# Patient Record
Sex: MALE | Race: WHITE | Employment: OTHER | URBAN - METROPOLITAN AREA
[De-identification: names, ages, dates, MRNs, and addresses within clinical notes are randomized per-mention and may not be internally consistent; named-entity substitution may affect disease eponyms.]

---

## 2018-01-22 ENCOUNTER — HOSPITAL ENCOUNTER (OUTPATIENT)
Dept: LAB | Age: 68
Discharge: HOME | End: 2018-01-22

## 2018-01-22 ENCOUNTER — HOSPITAL ENCOUNTER (OUTPATIENT)
Dept: LAB | Age: 68
Discharge: HOME | End: 2018-01-22
Payer: MEDICARE

## 2018-01-22 ENCOUNTER — OFFICE VISIT (OUTPATIENT)
Dept: HEMATOLOGY | Age: 68
End: 2018-01-22

## 2018-01-22 VITALS
HEART RATE: 68 BPM | HEIGHT: 74 IN | WEIGHT: 236 LBS | BODY MASS INDEX: 30.29 KG/M2 | SYSTOLIC BLOOD PRESSURE: 142 MMHG | TEMPERATURE: 98.7 F | DIASTOLIC BLOOD PRESSURE: 86 MMHG | OXYGEN SATURATION: 98 % | RESPIRATION RATE: 16 BRPM

## 2018-01-22 DIAGNOSIS — K74.60 CIRRHOSIS OF LIVER WITHOUT ASCITES, UNSPECIFIED HEPATIC CIRRHOSIS TYPE (HCC): ICD-10-CM

## 2018-01-22 DIAGNOSIS — K74.60 CIRRHOSIS OF LIVER WITHOUT ASCITES, UNSPECIFIED HEPATIC CIRRHOSIS TYPE (HCC): Primary | ICD-10-CM

## 2018-01-22 PROBLEM — I10 HYPERTENSION: Status: ACTIVE | Noted: 2018-01-22

## 2018-01-22 PROBLEM — K21.9 GERD (GASTROESOPHAGEAL REFLUX DISEASE): Status: ACTIVE | Noted: 2018-01-22

## 2018-01-22 PROBLEM — C85.90 NON HODGKIN'S LYMPHOMA (HCC): Status: ACTIVE | Noted: 2018-01-22

## 2018-01-22 PROBLEM — D69.6 THROMBOCYTOPENIA (HCC): Status: ACTIVE | Noted: 2018-01-22

## 2018-01-22 PROCEDURE — 82103 ALPHA-1-ANTITRYPSIN TOTAL: CPT | Performed by: INTERNAL MEDICINE

## 2018-01-22 PROCEDURE — 86038 ANTINUCLEAR ANTIBODIES: CPT | Performed by: INTERNAL MEDICINE

## 2018-01-22 PROCEDURE — 80076 HEPATIC FUNCTION PANEL: CPT | Performed by: INTERNAL MEDICINE

## 2018-01-22 PROCEDURE — 86708 HEPATITIS A ANTIBODY: CPT | Performed by: INTERNAL MEDICINE

## 2018-01-22 PROCEDURE — 82728 ASSAY OF FERRITIN: CPT | Performed by: INTERNAL MEDICINE

## 2018-01-22 PROCEDURE — 80048 BASIC METABOLIC PNL TOTAL CA: CPT | Performed by: INTERNAL MEDICINE

## 2018-01-22 PROCEDURE — 36415 COLL VENOUS BLD VENIPUNCTURE: CPT | Performed by: INTERNAL MEDICINE

## 2018-01-22 PROCEDURE — 83540 ASSAY OF IRON: CPT | Performed by: INTERNAL MEDICINE

## 2018-01-22 PROCEDURE — 81256 HFE GENE: CPT | Performed by: INTERNAL MEDICINE

## 2018-01-22 PROCEDURE — 85025 COMPLETE CBC W/AUTO DIFF WBC: CPT | Performed by: INTERNAL MEDICINE

## 2018-01-22 PROCEDURE — 85610 PROTHROMBIN TIME: CPT | Performed by: INTERNAL MEDICINE

## 2018-01-22 PROCEDURE — 83516 IMMUNOASSAY NONANTIBODY: CPT | Performed by: INTERNAL MEDICINE

## 2018-01-22 RX ORDER — LANOLIN ALCOHOL/MO/W.PET/CERES
1000 CREAM (GRAM) TOPICAL DAILY
COMMUNITY

## 2018-01-22 RX ORDER — CARVEDILOL 25 MG/1
25 TABLET ORAL 2 TIMES DAILY WITH MEALS
COMMUNITY

## 2018-01-22 RX ORDER — AMLODIPINE AND BENAZEPRIL HYDROCHLORIDE 5; 20 MG/1; MG/1
1 CAPSULE ORAL DAILY
COMMUNITY

## 2018-01-22 NOTE — PROGRESS NOTES
70 Lidia Mcnulty MD, Lane Butt, Cite Mello Tay, Janice Gross, CHIQUITA Durham, SALIMA Geller, [facility]   Claritza Lyons, CHIQUITA Browning, CHIQUITA Lopez [facility] 136    at [address], [address] [address]  22.    [phone]    FAX: [address] [place] [address]    at [facility], [address] [place] [address], [address]    [phone]    FAX: [phone]         Patient Care Team:  Aurelio Anne MD as PCP - General (Internal Medicine)      Problem List  Date Reviewed: 1/22/2018          Codes Class Noted    Non Hodgkin's lymphoma [facility]) ICD-10-CM: C85.90  ICD-9-CM: 202.80  1/22/2018        Thrombocytopenia ([address] ICD-10-CM: D69.6  ICD-9-CM: 287.5  1/22/2018        Hypertension ICD-10-CM: I10  ICD-9-CM: 401.9  1/22/2018        GERD (gastroesophageal reflux disease) ICD-10-CM: K21.9  ICD-9-CM: 530.81  1/22/2018                The physicians listed above have asked me to see Karin Mendoza in consultation regarding elevated liver enzymes and its management. All medical records sent by the referring physicians were reviewed including imaging studies     The patient is a 74 y.o.  male who was first noted to have abnormalities in liver transaminases in 2016. Serologic evaluation for markers of chronic liver disease were negative for HCV, HBV,iron studies,    Ultrasound of the liver was performed in 2016. The results of the imaging suggested fatty liver disease. Assessment of liver fibrosis was performed with sheer wave elastogrphy at [facility] in 2/2018. The result was 5.6 kPa which correlates with none-mild fibrosis. The patient had taken chemotherapy for lymphoma between 1/2014-3/2014.     The most recent laboratory studies indicate that the liver transaminases are elevated, ALP is normal, tests of hepatic synthetic and metabolic function are normal, and the platelet count is   depressed. The patient has no symptoms which could be attributed to the liver disorder. The patient completes all daily activities without any functional limitations. The patient has not experienced fatigue, pain in the right side over the liver,       ALLERGIES  No Known Allergies    MEDICATIONS  Current Outpatient Prescriptions   Medication Sig    amLODIPine-benazepril (LOTREL) 5-20 mg per capsule Take 1 Cap by mouth daily.  carvedilol (COREG) 25 mg tablet Take 25 mg by mouth two (2) times daily (with meals).  OMEPRAZOLE PO Take  by mouth.  cyanocobalamin 1,000 mcg tablet Take 1,000 mcg by mouth daily.  FERROUS SULFATE (IRON PO) Take  by mouth.  DOCOSAHEXANOIC ACID/EPA (FISH OIL PO) Take  by mouth. No current facility-administered medications for this visit. SYSTEM REVIEW NOT RELATED TO LIVER DISEASE OR REVIEWED ABOVE:  Constitution systems: Negative for fever, chills, weight gain, weight loss. Eyes: Negative for visual changes. ENT: Negative for sore throat, painful swallowing. Respiratory: Negative for cough, hemoptysis, SOB. Cardiology: Negative for chest pain, palpitations. GI:  Negative for constipation or diarrhea. : Negative for urinary frequency, dysuria, hematuria, nocturia. Skin: Negative for rash. Hematology: Negative for easy bruising, blood clots. Musculo-skelatal: Negative for back pain, muscle pain, weakness. Neurologic: Negative for headaches, dizziness, vertigo, memory problems not related to HE. Psychology: Negative for anxiety, depression. FAMILY HISTORY:  The father  of MI. The mother  of CHF. There is no family history of liver disease. SOCIAL HISTORY:  The patient is . The patient has 3 children, and 4 grandchildren.     The patient has never used tobacco products. The patient has never consumed significant amounts of alcohol. The patient currently works full time in real estate. PHYSICAL EXAMINATION:  Visit Vitals    /86 (BP 1 Location: Left arm, BP Patient Position: Sitting)    Pulse 68    Temp 98.7 °F (37.1 °C) (Tympanic)    Resp 16    Ht 6' 2\" (1.88 m)    Wt 236 lb (107 kg)    SpO2 98%    BMI 30.3 kg/m2     General: No acute distress. Eyes: Sclera anicteric. ENT: No oral lesions. Thyroid normal.  Nodes: No adenopathy. Skin: No spider angiomata. No jaundice. No palmar erythema. Respiratory: Lungs clear to auscultation. Cardiovascular: Regular heart rate. No murmurs. No JVD. Abdomen: Soft non-tender. Liver size normal to percussion/palpation. Spleen not palpable. No obvious ascites. Extremities: No edema. No muscle wasting. No gross arthritic changes. Neurologic: Alert and oriented. Cranial nerves grossly intact. No asterixis. LABORATORY STUDIES:  [facility] [place] [address] Units 1/22/2018   WBC 4.6 - 13.2 K/uL 6.0   ANC 1.8 - 8.0 K/UL 3.7   HGB 13.0 - 16.0 g/dL 14.3    - 420 K/uL 111 (L)   INR 0.8 - 1.2   1.0   AST 15 - 37 U/L 28   ALT 16 - 61 U/L 45   Alk Phos 45 - 117 U/L 83   Bili, Total 0.2 - 1.0 MG/DL 0.5   Bili, Direct 0.0 - 0.2 MG/DL 0.1   Albumin 3.4 - 5.0 g/dL 4.0   BUN 7.0 - 18 MG/DL 23 (H)   Creat 0.6 - 1.3 MG/DL 1.24   Na 136 - 145 mmol/L 140   K 3.5 - 5.5 mmol/L 4.0   Cl 100 - 108 mmol/L 103   CO2 21 - 32 mmol/L 29   Glucose 74 - 99 mg/dL 99     SEROLOGIES:  11/2016  HAV total negative, HBsAntigen negative, anti-HBcore negative, anti-HBsurface negative, anti-HCV negative,     Serologies Latest Ref Rng & Units 1/22/2018   Hep A Ab, Total NEGATIVE   NEGATIVE   Ferritin 8 - 388 NG/ML 93   Iron % Saturation % 34   DIAZ, IFA  NEGATIVE   ASMCA 0 - 19 Units 6   Alpha-1 antitrypsin level 90 - 200 mg/dL 73 (L)     LIVER HISTOLOGY:  2/2018. Sheer wave elastography performed at [facility].   E Range: 4.9-6.7 kPa, E Mean: 5.9 kPa, E Median: 5.7 kPa, E Std: 0.67 kPa. The results suggested a fibrosis level of F0-1. ENDOSCOPIC PROCEDURES:  Not available or performed    RADIOLOGY:  2/2018. Ultrasound of liver. Echogenic consistent with chronic liver disease. No liver mass lesions. No dilated bile ducts. No ascites. OTHER TESTING:  Not available or performed    ASSESSMENT AND PLAN:  Persistent elevation in liver transaminases of unclear etiology at this time. Liver function is normal.  The platelet count is normal.    Serologic testing to help define the cause of the laboratory abnormality were ordered. Will perform imaging of the liver with ultrasound. The need to assess liver fibrosis was discussed. Sheer wave elastography can assess liver fibrosis and determine if a patient has advanced fibrosis or cirrhosis without the need for liver biopsy. Sheer wave elastography is now available at [facility]. This will be scheduled. If elastrography suggests advanced fibrosis then a liver biopsy should be considered. The patient was counseled regarding alcohol consumption. Vaccination for viral hepatitis A and B is recommended since the patient has no serologic evidence of previous exposure or vaccination with immunity. All of the above issues were discussed with the patient. All questions were answered. The patient expressed a clear understanding of the above. [address] [place] [address] in 4 weeks to review all data and determine the treatment plan.     MD Joe Mckeon 13 of [facility] of [address], [address] [place] [address] - [address]  [phone]

## 2018-01-22 NOTE — PROGRESS NOTES
1. Have you been to the ER, urgent care clinic since your last visit? Hospitalized since your last visit? No    2. Have you seen or consulted any other health care providers outside of the [address] [place] [address] since your last visit? Include any pap smears or colon screening.  No

## 2018-01-22 NOTE — MR AVS SNAPSHOT
[address] [place] [address] 
[phone] Patient: Jeff Daniels MRN: [id number] [facility]:9/60[birth year]708 Visit Information Date & Time Provider Department Dept. Phone Encounter #  
 1/22/2018  3:30 PM MD Joe Alves 13 of  [address] [id number] Follow-up Instructions Return in about 4 weeks (around 2/19/2018) for MLS. Upcoming Health Maintenance Date Due Hepatitis C Screening 1950 DTaP/Tdap/Td series (1 - Tdap) 7/21/1973 FOBT Q 1 YEAR AGE 50-75 7/21/2002 ZOSTER VACCINE AGE 60> 5/21/2012 GLAUCOMA SCREENING Q2Y 7/21/2017 Pneumococcal 65+ Low/Medium Risk (1 of 2 - PCV13) 7/21/2017 MEDICARE YEARLY EXAM 7/21/2017 Influenza Age 5 to Adult 8/1/2017 Allergies as of 1/22/2018  Review Complete On: 1/22/2018 By: Marylee Bogus No Known Allergies Current Immunizations  Never Reviewed No immunizations on file. Not reviewed this visit You Were Diagnosed With   
  
 Codes Comments Cirrhosis of liver without ascites, unspecified hepatic cirrhosis type ([facility]    -  Primary ICD-10-CM: K74.60 ICD-9-CM: 571.5 Vitals BP Pulse Temp Resp Height(growth percentile) 142/86 (BP 1 Location: Left arm, BP Patient Position: Sitting) 68 98.7 °F (37.1 °C) (Tympanic) 16 6' 2\" (1.88 m) Weight(growth percentile) SpO2 BMI Smoking Status 236 lb (107 kg) 98% 30.3 kg/m2 Never Smoker Vitals History BMI and BSA Data Body Mass Index Body Surface Area  
 30.3 kg/m 2 2.36 m 2 Your Updated Medication List  
  
   
This list is accurate as of: 1/22/18  4:07 PM.  Always use your most recent med list. amLODIPine-benazepril 5-20 mg per capsule Commonly known as:  Raul Dubin Take 1 Cap by mouth daily. COREG 25 mg tablet Generic drug:  carvedilol Take 25 mg by mouth two (2) times daily (with meals). cyanocobalamin 1,000 mcg tablet Take 1,000 mcg by mouth daily. FISH OIL PO Take  by mouth. IRON PO Take  by mouth. OMEPRAZOLE PO Take  by mouth. Follow-up Instructions Return in about 4 weeks (around 2/19/2018) for MLS. To-Do List   
 01/22/2018 Imaging:  US ABD LTD W ELASTOGRAPHY Introducing [facility] & HEALTH SERVICES! Erin Maynard introduces Start[employer] patient portal. Now you can access parts of your medical record, email your doctor's office, and request medication refills online. 1. In your internet browser, go to [email or web address] 2. Click on the First Time User? Click Here link in the Sign In box. You will see the New Member Sign Up page. 3. Enter your StartX Access Code exactly as it appears below. You will not need to use this code after youve completed the sign-up process. If you do not sign up before the expiration date, you must request a new code. · StartX Access Code: [email or web address] Expires: 4/22/2018  4:07 PM 
 
4. Enter the last four digits of your Social Security Number (xxxx) and Date of Birth (mm/dd/yyyy) as indicated and click Submit. You will be taken to the next sign-up page. 5. Create a StartX ID. This will be your StartX login ID and cannot be changed, so think of one that is secure and easy to remember. 6. Create a StartX password. You can change your password at any time. 7. Enter your Password Reset Question and Answer. This can be used at a later time if you forget your password. 8. Enter your e-mail address. You will receive e-mail notification when new information is available in [address] 9. Click Sign Up. You can now view and download portions of your medical record. 10. Click the Download Summary menu link to download a portable copy of your medical information.  
 
If you have questions, please visit the Frequently Asked Questions section of the [employer]. Remember, [employer] is NOT to be used for urgent needs. For medical emergencies, dial 911. Now available from your iPhone and Android! Please provide this summary of care documentation to your next provider. Your primary care clinician is listed as Leo Ram. If you have any questions after today's visit, please call [phone].

## 2018-01-23 LAB
ALBUMIN SERPL-MCNC: 4 G/DL (ref 3.4–5)
ALBUMIN/GLOB SERPL: 1.3 {RATIO} (ref 0.8–1.7)
ALP SERPL-CCNC: 83 U/L (ref 45–117)
ALT SERPL-CCNC: 45 U/L (ref 16–61)
ANION GAP SERPL CALC-SCNC: 8 MMOL/L (ref 3–18)
AST SERPL-CCNC: 28 U/L (ref 15–37)
BASOPHILS # BLD: 0 K/UL (ref 0–0.06)
BASOPHILS NFR BLD: 1 % (ref 0–2)
BILIRUB DIRECT SERPL-MCNC: 0.1 MG/DL (ref 0–0.2)
BILIRUB SERPL-MCNC: 0.5 MG/DL (ref 0.2–1)
BUN SERPL-MCNC: 23 MG/DL (ref 7–18)
BUN/CREAT SERPL: 19 (ref 12–20)
CALCIUM SERPL-MCNC: 9.1 MG/DL (ref 8.5–10.1)
CHLORIDE SERPL-SCNC: 103 MMOL/L (ref 100–108)
CO2 SERPL-SCNC: 29 MMOL/L (ref 21–32)
CREAT SERPL-MCNC: 1.24 MG/DL (ref 0.6–1.3)
DIFFERENTIAL METHOD BLD: ABNORMAL
EOSINOPHIL # BLD: 0.3 K/UL (ref 0–0.4)
EOSINOPHIL NFR BLD: 5 % (ref 0–5)
ERYTHROCYTE [DISTWIDTH] IN BLOOD BY AUTOMATED COUNT: 13.5 % (ref 11.6–14.5)
FERRITIN SERPL-MCNC: 93 NG/ML (ref 8–388)
GLOBULIN SER CALC-MCNC: 3 G/DL (ref 2–4)
GLUCOSE SERPL-MCNC: 99 MG/DL (ref 74–99)
HCT VFR BLD AUTO: 42.3 % (ref 36–48)
HGB BLD-MCNC: 14.3 G/DL (ref 13–16)
INR PPP: 1 (ref 0.8–1.2)
IRON SATN MFR SERPL: 34 %
IRON SERPL-MCNC: 110 UG/DL (ref 50–175)
LYMPHOCYTES # BLD: 1.4 K/UL (ref 0.9–3.6)
LYMPHOCYTES NFR BLD: 23 % (ref 21–52)
MCH RBC QN AUTO: 31.6 PG (ref 24–34)
MCHC RBC AUTO-ENTMCNC: 33.8 G/DL (ref 31–37)
MCV RBC AUTO: 93.6 FL (ref 74–97)
MONOCYTES # BLD: 0.6 K/UL (ref 0.05–1.2)
MONOCYTES NFR BLD: 10 % (ref 3–10)
NEUTS SEG # BLD: 3.7 K/UL (ref 1.8–8)
NEUTS SEG NFR BLD: 61 % (ref 40–73)
PLATELET # BLD AUTO: 111 K/UL (ref 135–420)
PMV BLD AUTO: 10.2 FL (ref 9.2–11.8)
POTASSIUM SERPL-SCNC: 4 MMOL/L (ref 3.5–5.5)
PROT SERPL-MCNC: 7 G/DL (ref 6.4–8.2)
PROTHROMBIN TIME: 13 SEC (ref 11.5–15.2)
RBC # BLD AUTO: 4.52 M/UL (ref 4.7–5.5)
SODIUM SERPL-SCNC: 140 MMOL/L (ref 136–145)
TIBC SERPL-MCNC: 328 UG/DL (ref 250–450)
WBC # BLD AUTO: 6 K/UL (ref 4.6–13.2)

## 2018-01-24 LAB
A1AT SERPL-MCNC: 73 MG/DL (ref 90–200)
ACTIN IGG SERPL-ACNC: 6 UNITS (ref 0–19)
ANA TITR SER IF: NEGATIVE {TITER}
HAV AB SER QL IA: NEGATIVE

## 2018-01-26 LAB — HFE GENE MUT ANL BLD/T: NORMAL

## 2018-02-14 ENCOUNTER — HOSPITAL ENCOUNTER (OUTPATIENT)
Dept: ULTRASOUND IMAGING | Age: 68
Discharge: HOME | End: 2018-02-14
Attending: INTERNAL MEDICINE
Payer: MEDICARE

## 2018-02-14 DIAGNOSIS — K74.60 CIRRHOSIS OF LIVER WITHOUT ASCITES, UNSPECIFIED HEPATIC CIRRHOSIS TYPE (HCC): ICD-10-CM

## 2018-02-14 PROCEDURE — 0346T US ABD LTD W ELASTOGRAPHY: CPT

## 2018-02-19 ENCOUNTER — HOSPITAL ENCOUNTER (OUTPATIENT)
Dept: LAB | Age: 68
Discharge: HOME | End: 2018-02-19
Payer: MEDICARE

## 2018-02-19 ENCOUNTER — OFFICE VISIT (OUTPATIENT)
Dept: HEMATOLOGY | Age: 68
End: 2018-02-19

## 2018-02-19 VITALS
RESPIRATION RATE: 20 BRPM | SYSTOLIC BLOOD PRESSURE: 138 MMHG | DIASTOLIC BLOOD PRESSURE: 92 MMHG | OXYGEN SATURATION: 98 % | HEART RATE: 79 BPM | HEIGHT: 74 IN | TEMPERATURE: 97.6 F | BODY MASS INDEX: 29.65 KG/M2 | WEIGHT: 231 LBS

## 2018-02-19 DIAGNOSIS — E88.01 ALPHA-1-ANTITRYPSIN DEFICIENCY (HCC): ICD-10-CM

## 2018-02-19 DIAGNOSIS — E88.01 ALPHA-1-ANTITRYPSIN DEFICIENCY (HCC): Primary | ICD-10-CM

## 2018-02-19 PROCEDURE — 36415 COLL VENOUS BLD VENIPUNCTURE: CPT | Performed by: NURSE PRACTITIONER

## 2018-02-19 PROCEDURE — 82103 ALPHA-1-ANTITRYPSIN TOTAL: CPT | Performed by: NURSE PRACTITIONER

## 2018-02-19 RX ORDER — WARFARIN SODIUM 5 MG/1
5 TABLET ORAL
COMMUNITY
Start: 2018-01-30 | End: 2018-03-01

## 2018-02-19 NOTE — PROGRESS NOTES
Davion Townsend is a 74 y.o. male    No chief complaint on file. 1. Have you been to the ER, urgent care clinic or hospitalized since your last visit? YES.     2. Have you seen or consulted any other health care providers outside of the [address] [place] [address] since your last visit (Include any pap smears or colon screening)? YES  Patient went to [facility] for R knee replacement in [facility].   Learning Assessment 1/22/2018   PRIMARY LEARNER Patient   HIGHEST LEVEL OF EDUCATION - PRIMARY LEARNER  SOME COLLEGE   BARRIERS PRIMARY LEARNER NONE   CO-LEARNER CAREGIVER No   PRIMARY LANGUAGE ENGLISH   LEARNER PREFERENCE PRIMARY DEMONSTRATION   ANSWERED BY patient   RELATIONSHIP SELF

## 2018-02-19 NOTE — PROGRESS NOTES
70 Lidia Mcnulty MD, Stefani Olsen, [place], [place]       CHIQUITA Welsh PA-C Solon Quin, [facility]   Lisa Carr, CHIQUITA Norman, CHIQUITA Lopez [facility] [address] [facility] 136    at [facility]    [address], [address] [facility], [place]  22.    [phone]    FAX: [address] [place] [address], [address], [address]    [phone]    FAX: [phone]         Patient Care Team:  Fara Arias MD as PCP - General (Internal Medicine)  Maryann Meyer MD (Oncology)      Problem List  Date Reviewed: 1/22/2018          Codes Class Noted    Non Hodgkin's lymphoma [facility]) ICD-10-CM: C85.90  ICD-9-CM: 202.80  1/22/2018        Thrombocytopenia ([address] ICD-10-CM: D69.6  ICD-9-CM: 287.5  1/22/2018        Hypertension ICD-10-CM: I10  ICD-9-CM: 401.9  1/22/2018        GERD (gastroesophageal reflux disease) ICD-10-CM: K21.9  ICD-9-CM: 530.81  1/22/2018              Domenica Goodman returns to the [facility] [place] for management of elevated liver enzymes. The active problem list, all pertinent past medical history, medications, radiologic findings   and laboratory findings related to the liver disorder were reviewed with the patient. The patient is a 74 y.o.  male who was first noted to have abnormalities in liver transaminases in 2016. Serologic evaluation for markers of chronic liver disease were positive for a low alpha-1-antitrypsin. Ultrasound of the liver was performed in 2016. The results of the imaging suggested fatty liver disease. Assessment of liver fibrosis was performed with sheer wave elastography at [facility] in 2/2018. The result was 5.6 kPa which correlates with none-mild fibrosis.     The patient had taken chemotherapy for lymphoma between 2014-3/2014. The most recent laboratory studies indicate that the liver transaminases are elevated, ALP is normal, tests of hepatic synthetic and metabolic function are normal, and the platelet count is   depressed. The patient has no symptoms which could be attributed to the liver disorder. The patient completes all daily activities without any functional limitations. The patient has not experienced fatigue, pain in the right side over the liver,       ALLERGIES  No Known Allergies    MEDICATIONS  Current Outpatient Prescriptions   Medication Sig    warfarin (COUMADIN) 5 mg tablet Take 5 mg by mouth.  amLODIPine-benazepril (LOTREL) 5-20 mg per capsule Take 1 Cap by mouth daily.  carvedilol (COREG) 25 mg tablet Take 25 mg by mouth two (2) times daily (with meals).  OMEPRAZOLE PO Take  by mouth.  cyanocobalamin 1,000 mcg tablet Take 1,000 mcg by mouth daily.  FERROUS SULFATE (IRON PO) Take  by mouth.  DOCOSAHEXANOIC ACID/EPA (FISH OIL PO) Take  by mouth. No current facility-administered medications for this visit. SYSTEM REVIEW NOT RELATED TO LIVER DISEASE OR REVIEWED ABOVE:  Constitution systems: Negative for fever, chills, weight gain, weight loss. Eyes: Negative for visual changes. ENT: Negative for sore throat, painful swallowing. Respiratory: Negative for cough, hemoptysis, SOB. Cardiology: Negative for chest pain, palpitations. GI:  Negative for constipation or diarrhea. : Negative for urinary frequency, dysuria, hematuria, nocturia. Skin: Negative for rash. Hematology: Negative for easy bruising, blood clots. Musculo-skelatal: Negative for back pain, muscle pain, weakness. Neurologic: Negative for headaches, dizziness, vertigo, memory problems not related to HE. Psychology: Negative for anxiety, depression. FAMILY HISTORY:  The father  of MI. The mother  of CHF.     There is no family history of liver disease. SOCIAL HISTORY:  The patient is . The patient has 3 children, and 4 grandchildren. The patient has never used tobacco products. The patient has never consumed significant amounts of alcohol. The patient currently works full time in real estate. PHYSICAL EXAMINATION:  Visit Vitals    BP (!) 138/92 (BP 1 Location: Right arm, BP Patient Position: Sitting)    Pulse 79    Temp 97.6 °F (36.4 °C) (Tympanic)    Resp 20    Ht 6' 2\" (1.88 m)    Wt 231 lb (104.8 kg)    SpO2 98%    BMI 29.66 kg/m2     General: No acute distress. Eyes: Sclera anicteric. ENT: No oral lesions. Thyroid normal.  Nodes: No adenopathy. Skin: No spider angiomata. No jaundice. No palmar erythema. Respiratory: Lungs clear to auscultation. Cardiovascular: Regular heart rate. No murmurs. No JVD. Abdomen: Soft non-tender. Liver size normal to percussion/palpation. Spleen not palpable. No obvious ascites. Extremities: No edema. No muscle wasting. No gross arthritic changes. Neurologic: Alert and oriented. Cranial nerves grossly intact. No asterixis.     LABORATORY STUDIES:  Liver [place] of [address] Units 1/22/2018   WBC 4.6 - 13.2 K/uL 6.0   ANC 1.8 - 8.0 K/UL 3.7   HGB 13.0 - 16.0 g/dL 14.3    - 420 K/uL 111 (L)   INR 0.8 - 1.2   1.0   AST 15 - 37 U/L 28   ALT 16 - 61 U/L 45   Alk Phos 45 - 117 U/L 83   Bili, Total 0.2 - 1.0 MG/DL 0.5   Bili, Direct 0.0 - 0.2 MG/DL 0.1   Albumin 3.4 - 5.0 g/dL 4.0   BUN 7.0 - 18 MG/DL 23 (H)   Creat 0.6 - 1.3 MG/DL 1.24   Na 136 - 145 mmol/L 140   K 3.5 - 5.5 mmol/L 4.0   Cl 100 - 108 mmol/L 103   CO2 21 - 32 mmol/L 29   Glucose 74 - 99 mg/dL 99     SEROLOGIES:  11/2016  HAV total negative, HBsAntigen negative, anti-HBcore negative, anti-HBsurface negative, anti-HCV negative,     Serologies Latest Ref Rng & Units 1/22/2018   Hep A Ab, Total NEGATIVE   NEGATIVE   Ferritin 8 - 388 NG/ML 93   Iron % Saturation % 34   DIAZ, IFA NEGATIVE   ASMCA 0 - 19 Units 6   Alpha-1 antitrypsin level 90 - 200 mg/dL 73 (L)     LIVER HISTOLOGY:  2/2018. Sheer wave elastography performed at [facility]. E Range: 4.9-6.7 kPa, E Mean: 5.9 kPa, E Median: 5.7 kPa, E Std: 0.67 kPa. The results suggested a fibrosis level of F0-1. ENDOSCOPIC PROCEDURES:  Not available or performed    RADIOLOGY:  2/2018. Ultrasound of liver. Echogenic consistent with chronic liver disease. No liver mass lesions. No dilated bile ducts. No ascites. OTHER TESTING:  Not available or performed    ASSESSMENT AND PLAN:  Intermitant elevation in liver transaminases with none to mild fibrosis by elastography. Liver function is normal.  The platelet count is depressed. Based upon laboratory studies and imaging the patient does not appear to have advanced liver disease or cirrhosis. Alpha-1-antitrypsin was low. Will order alpha-1-antitrypsin phenotype to determine if patient is a carrier. We will call the patient with this result. The most likely causes for the liver chemistry abnormalities were discussed with the patient and include fatty liver disease. Will perform laboratory testing to monitor liver function and degree of liver injury. This included BMP, hepatic panel, CBC with platelet count. The patient was directed to continue all current medications at the current dosages. There are no contraindications for the patient to take any medications that are necessary for treatment of other medical issues. The patient was counseled regarding alcohol consumption. Vaccination for viral hepatitis A is recommended since the patient has no serologic evidence of previous exposure or vaccination with immunity. All of the above issues were discussed with the patient. All questions were answered. The patient expressed a clear understanding of the above. Follow-up Romero Lester 32 in 1 year for US with elastography.  If no change for 2 years patient will no longer need to be followed.      Rojelio Ulloa MD  Liver [place] of [address] [place] [address], [address] [place] [address] Eneida Garcia, [address] [place] [address] Street [address]  [phone]

## 2018-02-19 NOTE — MR AVS SNAPSHOT
[address] [address] [address] [address] 
[phone] Patient: Yomi Poon MRN: [id number] OYM:[birth year] Visit Information Date & Time Provider Department Dept. Phone Encounter #  
 2/19/2018 10:35 AM MD Joe Marina [address] [id number] Follow-up Instructions Return in about 1 year (around 2/19/2019) for [place] . Upcoming Health Maintenance Date Due Hepatitis C Screening 1950 DTaP/Tdap/Td series (1 - Tdap) 7/21/1973 FOBT Q 1 YEAR AGE 50-75 7/21/2002 ZOSTER VACCINE AGE 60> 5/21/2012 GLAUCOMA SCREENING Q2Y 7/21/2017 Pneumococcal 65+ High/Highest Risk (1 of 2 - PCV13) 7/21/2017 MEDICARE YEARLY EXAM 7/21/2017 Influenza Age 5 to Adult 8/1/2017 Allergies as of 2/19/2018  Review Complete On: 2/19/2018 By: Chen Chirinos No Known Allergies Current Immunizations  Never Reviewed No immunizations on file. Not reviewed this visit You Were Diagnosed With   
  
 Codes Comments Alpha-1-antitrypsin deficiency ([facility].)    -  Primary ICD-10-CM: E88.01 
ICD-9-CM: 273.4 Vitals BP Pulse Temp Resp Height(growth percentile) (!) 138/92 (BP 1 Location: Right arm, BP Patient Position: Sitting) 79 97.6 °F (36.4 °C) (Tympanic) 20 6' 2\" (1.88 m) Weight(growth percentile) SpO2 BMI Smoking Status 231 lb (104.8 kg) 98% 29.66 kg/m2 Never Smoker BMI and BSA Data Body Mass Index Body Surface Area  
 29.66 kg/m 2 2.34 m 2 Your Updated Medication List  
  
   
This list is accurate as of: 2/19/18 11:17 AM.  Always use your most recent med list. amLODIPine-benazepril 5-20 mg per capsule Commonly known as:  Wyvonne Satchel Take 1 Cap by mouth daily. COREG 25 mg tablet Generic drug:  carvedilol Take 25 mg by mouth two (2) times daily (with meals). cyanocobalamin 1,000 mcg tablet Take 1,000 mcg by mouth daily. FISH OIL PO Take  by mouth. IRON PO Take  by mouth. OMEPRAZOLE PO Take  by mouth.  
  
 warfarin 5 mg tablet Commonly known as:  COUMADIN Take 5 mg by mouth. Follow-up Instructions Return in about 1 year (around 2/19/2019) for [place] . To-Do List   
 02/19/2018 Lab:  ALPHA-1-ANTITRYPSIN, TOTAL, PHENOTYPE Introducing [facility] SERVICES! Bere Oliver introduces [employer] patient portal. Now you can access parts of your medical record, email your doctor's office, and request medication refills online. 1. In your internet browser, go to [email or web address] 2. Click on the First Time User? Click Here link in the Sign In box. You will see the New Member Sign Up page. 3. Enter your [employer] Access Code exactly as it appears below. You will not need to use this code after youve completed the sign-up process. If you do not sign up before the expiration date, you must request a new code. · [employer] Access Code: [email or web address] Expires: 4/22/2018  4:07 PM 
 
4. Enter the last four digits of your Social Security Number (xxxx) and Date of Birth (mm/dd/yyyy) as indicated and click Submit. You will be taken to the next sign-up page. 5. Create a [employer] ID. This will be your [employer] login ID and cannot be changed, so think of one that is secure and easy to remember. 6. Create a [employer] password. You can change your password at any time. 7. Enter your Password Reset Question and Answer. This can be used at a later time if you forget your password. 8. Enter your e-mail address. You will receive e-mail notification when new information is available in [address] 9. Click Sign Up. You can now view and download portions of your medical record. 10. Click the Download Summary menu link to download a portable copy of your medical information.  
 
If you have questions, please visit the Frequently Asked Questions section of the [employer]. Remember, [facility] is NOT to be used for urgent needs. For medical emergencies, dial 911. Now available from your iPhone and Android! Please provide this summary of care documentation to your next provider. Your primary care clinician is listed as Gerald Coyle. If you have any questions after today's visit, please call [phone].

## 2018-02-20 LAB
A1AT PHENOTYP SERPL IFE: NORMAL
A1AT SERPL-MCNC: 99 MG/DL (ref 90–200)